# Patient Record
Sex: MALE | Race: WHITE | Employment: PART TIME | URBAN - METROPOLITAN AREA
[De-identification: names, ages, dates, MRNs, and addresses within clinical notes are randomized per-mention and may not be internally consistent; named-entity substitution may affect disease eponyms.]

---

## 2017-02-21 ENCOUNTER — OFFICE VISIT (OUTPATIENT)
Dept: FAMILY MEDICINE CLINIC | Age: 57
End: 2017-02-21

## 2017-02-21 VITALS
WEIGHT: 147.6 LBS | HEART RATE: 78 BPM | HEIGHT: 74 IN | RESPIRATION RATE: 18 BRPM | BODY MASS INDEX: 18.94 KG/M2 | TEMPERATURE: 98.6 F | SYSTOLIC BLOOD PRESSURE: 116 MMHG | DIASTOLIC BLOOD PRESSURE: 74 MMHG

## 2017-02-21 DIAGNOSIS — K63.5 COLON POLYP: ICD-10-CM

## 2017-02-21 DIAGNOSIS — T48.5X5A RHINITIS MEDICAMENTOSA: ICD-10-CM

## 2017-02-21 DIAGNOSIS — K64.4 EXTERNAL HEMORRHOID: ICD-10-CM

## 2017-02-21 DIAGNOSIS — J31.0 RHINITIS MEDICAMENTOSA: ICD-10-CM

## 2017-02-21 DIAGNOSIS — C49.9 SARCOMA (HCC): Primary | ICD-10-CM

## 2017-02-21 DIAGNOSIS — J30.81 CAT ALLERGY, AIRBORNE: ICD-10-CM

## 2017-02-21 PROCEDURE — 99214 OFFICE O/P EST MOD 30 MIN: CPT | Performed by: FAMILY MEDICINE

## 2017-04-03 ENCOUNTER — OFFICE VISIT (OUTPATIENT)
Dept: CARDIOLOGY | Age: 57
End: 2017-04-03

## 2017-04-03 ENCOUNTER — HOSPITAL ENCOUNTER (OUTPATIENT)
Dept: NON INVASIVE DIAGNOSTICS | Age: 57
Discharge: HOME | End: 2017-04-03
Payer: COMMERCIAL

## 2017-04-03 VITALS
WEIGHT: 154.2 LBS | BODY MASS INDEX: 19.79 KG/M2 | OXYGEN SATURATION: 98 % | HEIGHT: 74 IN | SYSTOLIC BLOOD PRESSURE: 130 MMHG | DIASTOLIC BLOOD PRESSURE: 80 MMHG | HEART RATE: 88 BPM

## 2017-04-03 DIAGNOSIS — Z95.0 PACEMAKER, ARTIFICIAL: ICD-10-CM

## 2017-04-03 DIAGNOSIS — C49.9 SARCOMA (HCC): Primary | ICD-10-CM

## 2017-04-03 LAB
LV EF: 48 %
LVEF MODALITY: NORMAL

## 2017-04-03 PROCEDURE — 93306 TTE W/DOPPLER COMPLETE: CPT

## 2017-04-03 PROCEDURE — 99204 OFFICE O/P NEW MOD 45 MIN: CPT | Performed by: INTERNAL MEDICINE

## 2017-04-03 RX ORDER — PANTOPRAZOLE SODIUM 40 MG/1
TABLET, DELAYED RELEASE ORAL
Refills: 0 | COMMUNITY
Start: 2017-03-20 | End: 2017-08-10 | Stop reason: ALTCHOICE

## 2017-04-03 RX ORDER — OXYCODONE HYDROCHLORIDE 5 MG/1
TABLET ORAL
COMMUNITY
Start: 2017-03-31 | End: 2017-08-10 | Stop reason: ALTCHOICE

## 2017-04-03 ASSESSMENT — ENCOUNTER SYMPTOMS: RESPIRATORY NEGATIVE: 1

## 2017-04-06 ENCOUNTER — OFFICE VISIT (OUTPATIENT)
Dept: SURGERY | Age: 57
End: 2017-04-06

## 2017-04-06 ENCOUNTER — PREP FOR PROCEDURE (OUTPATIENT)
Dept: SURGERY | Age: 57
End: 2017-04-06

## 2017-04-06 VITALS
TEMPERATURE: 98.2 F | DIASTOLIC BLOOD PRESSURE: 86 MMHG | HEIGHT: 74 IN | SYSTOLIC BLOOD PRESSURE: 130 MMHG | BODY MASS INDEX: 19.64 KG/M2 | WEIGHT: 153 LBS

## 2017-04-06 DIAGNOSIS — I87.8 POOR VENOUS ACCESS: Primary | ICD-10-CM

## 2017-04-06 PROCEDURE — 99202 OFFICE O/P NEW SF 15 MIN: CPT | Performed by: SURGERY

## 2017-04-06 RX ORDER — ASPIRIN 325 MG
325 TABLET ORAL EVERY 6 HOURS PRN
COMMUNITY
End: 2017-04-28

## 2017-04-06 ASSESSMENT — ENCOUNTER SYMPTOMS
ALLERGIC/IMMUNOLOGIC NEGATIVE: 1
COLOR CHANGE: 0
ABDOMINAL DISTENTION: 0
CONSTIPATION: 0
RHINORRHEA: 0
CHEST TIGHTNESS: 0
BLOOD IN STOOL: 0
ABDOMINAL PAIN: 0
EYES NEGATIVE: 1
SHORTNESS OF BREATH: 0
RESPIRATORY NEGATIVE: 1
GASTROINTESTINAL NEGATIVE: 1

## 2017-04-10 ENCOUNTER — OFFICE VISIT (OUTPATIENT)
Dept: CARDIOLOGY | Age: 57
End: 2017-04-10

## 2017-04-10 VITALS
WEIGHT: 153 LBS | RESPIRATION RATE: 12 BRPM | DIASTOLIC BLOOD PRESSURE: 80 MMHG | HEIGHT: 74 IN | SYSTOLIC BLOOD PRESSURE: 118 MMHG | HEART RATE: 84 BPM | BODY MASS INDEX: 19.64 KG/M2

## 2017-04-10 DIAGNOSIS — I34.0 MITRAL VALVE INSUFFICIENCY, UNSPECIFIED ETIOLOGY: ICD-10-CM

## 2017-04-10 DIAGNOSIS — Z95.0 PACEMAKER, ARTIFICIAL: Primary | ICD-10-CM

## 2017-04-10 PROCEDURE — 99213 OFFICE O/P EST LOW 20 MIN: CPT | Performed by: INTERNAL MEDICINE

## 2017-04-10 ASSESSMENT — ENCOUNTER SYMPTOMS
COLOR CHANGE: 0
COUGH: 0
CHEST TIGHTNESS: 0
APNEA: 0
SHORTNESS OF BREATH: 0

## 2017-04-13 ENCOUNTER — ANESTHESIA EVENT (OUTPATIENT)
Dept: OPERATING ROOM | Age: 57
End: 2017-04-13
Payer: COMMERCIAL

## 2017-04-14 ENCOUNTER — HOSPITAL ENCOUNTER (OUTPATIENT)
Dept: GENERAL RADIOLOGY | Age: 57
Setting detail: OUTPATIENT SURGERY
Discharge: HOME | End: 2017-04-14
Attending: SURGERY
Payer: COMMERCIAL

## 2017-04-14 ENCOUNTER — HOSPITAL ENCOUNTER (OUTPATIENT)
Age: 57
Setting detail: OUTPATIENT SURGERY
Discharge: HOME HEALTH | End: 2017-04-14
Attending: SURGERY | Admitting: SURGERY
Payer: COMMERCIAL

## 2017-04-14 ENCOUNTER — ANESTHESIA (OUTPATIENT)
Dept: OPERATING ROOM | Age: 57
End: 2017-04-14
Payer: COMMERCIAL

## 2017-04-14 VITALS
HEART RATE: 66 BPM | RESPIRATION RATE: 16 BRPM | DIASTOLIC BLOOD PRESSURE: 90 MMHG | SYSTOLIC BLOOD PRESSURE: 150 MMHG | OXYGEN SATURATION: 97 % | TEMPERATURE: 96.8 F

## 2017-04-14 VITALS — OXYGEN SATURATION: 100 % | SYSTOLIC BLOOD PRESSURE: 122 MMHG | TEMPERATURE: 93.7 F | DIASTOLIC BLOOD PRESSURE: 65 MMHG

## 2017-04-14 DIAGNOSIS — Z95.828 PORT CATHETER IN PLACE: ICD-10-CM

## 2017-04-14 PROCEDURE — 2580000003 HC RX 258: Performed by: STUDENT IN AN ORGANIZED HEALTH CARE EDUCATION/TRAINING PROGRAM

## 2017-04-14 PROCEDURE — 77001 FLUOROGUIDE FOR VEIN DEVICE: CPT | Performed by: SURGERY

## 2017-04-14 PROCEDURE — 3700000001 HC ADD 15 MINUTES (ANESTHESIA): Performed by: SURGERY

## 2017-04-14 PROCEDURE — 7100000010 HC PHASE II RECOVERY - FIRST 15 MIN: Performed by: SURGERY

## 2017-04-14 PROCEDURE — 6360000002 HC RX W HCPCS: Performed by: SURGERY

## 2017-04-14 PROCEDURE — 2500000003 HC RX 250 WO HCPCS: Performed by: STUDENT IN AN ORGANIZED HEALTH CARE EDUCATION/TRAINING PROGRAM

## 2017-04-14 PROCEDURE — 6370000000 HC RX 637 (ALT 250 FOR IP): Performed by: SURGERY

## 2017-04-14 PROCEDURE — 3600000013 HC SURGERY LEVEL 3 ADDTL 15MIN: Performed by: SURGERY

## 2017-04-14 PROCEDURE — C1788 PORT, INDWELLING, IMP: HCPCS | Performed by: SURGERY

## 2017-04-14 PROCEDURE — 7100000011 HC PHASE II RECOVERY - ADDTL 15 MIN: Performed by: SURGERY

## 2017-04-14 PROCEDURE — 6360000004 HC RX CONTRAST MEDICATION: Performed by: SURGERY

## 2017-04-14 PROCEDURE — 7100000000 HC PACU RECOVERY - FIRST 15 MIN: Performed by: SURGERY

## 2017-04-14 PROCEDURE — 2580000003 HC RX 258: Performed by: SURGERY

## 2017-04-14 PROCEDURE — 7100000001 HC PACU RECOVERY - ADDTL 15 MIN: Performed by: SURGERY

## 2017-04-14 PROCEDURE — 3209999900 FLUORO FOR SURGICAL PROCEDURES

## 2017-04-14 PROCEDURE — 3700000000 HC ANESTHESIA ATTENDED CARE: Performed by: SURGERY

## 2017-04-14 PROCEDURE — 3600000003 HC SURGERY LEVEL 3 BASE: Performed by: SURGERY

## 2017-04-14 PROCEDURE — 2580000003 HC RX 258: Performed by: NURSE ANESTHETIST, CERTIFIED REGISTERED

## 2017-04-14 PROCEDURE — 2500000003 HC RX 250 WO HCPCS: Performed by: NURSE ANESTHETIST, CERTIFIED REGISTERED

## 2017-04-14 PROCEDURE — 36571 INSERT PICVAD CATH: CPT | Performed by: SURGERY

## 2017-04-14 PROCEDURE — 6360000002 HC RX W HCPCS: Performed by: NURSE ANESTHETIST, CERTIFIED REGISTERED

## 2017-04-14 PROCEDURE — 2500000003 HC RX 250 WO HCPCS: Performed by: SURGERY

## 2017-04-14 DEVICE — BARDPORT M.R.I. IMPLANTABLE PORT WITH ATTACHABLE 9.6F OPEN-ENDED SINGLE-LUMEN VENOUS CATHETER INTERMEDIATE KIT (WITH PEEL-APART INTRODUCER)
Type: IMPLANTABLE DEVICE | Site: CHEST | Status: FUNCTIONAL
Brand: BARDPORT M.R.I.

## 2017-04-14 RX ORDER — SODIUM CHLORIDE 0.9 % (FLUSH) 0.9 %
10 SYRINGE (ML) INJECTION PRN
Status: DISCONTINUED | OUTPATIENT
Start: 2017-04-14 | End: 2017-04-14 | Stop reason: HOSPADM

## 2017-04-14 RX ORDER — GLYCOPYRROLATE 0.2 MG/ML
INJECTION INTRAMUSCULAR; INTRAVENOUS PRN
Status: DISCONTINUED | OUTPATIENT
Start: 2017-04-14 | End: 2017-04-14 | Stop reason: SDUPTHER

## 2017-04-14 RX ORDER — SODIUM CHLORIDE, SODIUM LACTATE, POTASSIUM CHLORIDE, CALCIUM CHLORIDE 600; 310; 30; 20 MG/100ML; MG/100ML; MG/100ML; MG/100ML
INJECTION, SOLUTION INTRAVENOUS CONTINUOUS
Status: DISCONTINUED | OUTPATIENT
Start: 2017-04-14 | End: 2017-04-14 | Stop reason: HOSPADM

## 2017-04-14 RX ORDER — LIDOCAINE HYDROCHLORIDE 20 MG/ML
INJECTION, SOLUTION INFILTRATION; PERINEURAL PRN
Status: DISCONTINUED | OUTPATIENT
Start: 2017-04-14 | End: 2017-04-14 | Stop reason: SDUPTHER

## 2017-04-14 RX ORDER — HYDROCODONE BITARTRATE AND ACETAMINOPHEN 5; 325 MG/1; MG/1
TABLET ORAL
Qty: 40 TABLET | Refills: 0 | Status: SHIPPED | OUTPATIENT
Start: 2017-04-14 | End: 2017-04-28

## 2017-04-14 RX ORDER — HYDROCODONE BITARTRATE AND ACETAMINOPHEN 5; 325 MG/1; MG/1
2 TABLET ORAL EVERY 4 HOURS PRN
Status: DISCONTINUED | OUTPATIENT
Start: 2017-04-14 | End: 2017-04-14 | Stop reason: HOSPADM

## 2017-04-14 RX ORDER — MIDAZOLAM HYDROCHLORIDE 1 MG/ML
INJECTION INTRAMUSCULAR; INTRAVENOUS PRN
Status: DISCONTINUED | OUTPATIENT
Start: 2017-04-14 | End: 2017-04-14 | Stop reason: SDUPTHER

## 2017-04-14 RX ORDER — MIDAZOLAM HYDROCHLORIDE 1 MG/ML
INJECTION INTRAMUSCULAR; INTRAVENOUS
Status: DISCONTINUED
Start: 2017-04-14 | End: 2017-04-14 | Stop reason: HOSPADM

## 2017-04-14 RX ORDER — ONDANSETRON 2 MG/ML
4 INJECTION INTRAMUSCULAR; INTRAVENOUS EVERY 6 HOURS PRN
Status: DISCONTINUED | OUTPATIENT
Start: 2017-04-14 | End: 2017-04-14 | Stop reason: HOSPADM

## 2017-04-14 RX ORDER — HEPARIN SODIUM (PORCINE) LOCK FLUSH IV SOLN 100 UNIT/ML 100 UNIT/ML
SOLUTION INTRAVENOUS PRN
Status: DISCONTINUED | OUTPATIENT
Start: 2017-04-14 | End: 2017-04-14 | Stop reason: HOSPADM

## 2017-04-14 RX ORDER — MORPHINE SULFATE 2 MG/ML
1 INJECTION, SOLUTION INTRAMUSCULAR; INTRAVENOUS
Status: DISCONTINUED | OUTPATIENT
Start: 2017-04-14 | End: 2017-04-14 | Stop reason: HOSPADM

## 2017-04-14 RX ORDER — SODIUM CHLORIDE, SODIUM LACTATE, POTASSIUM CHLORIDE, CALCIUM CHLORIDE 600; 310; 30; 20 MG/100ML; MG/100ML; MG/100ML; MG/100ML
INJECTION, SOLUTION INTRAVENOUS CONTINUOUS PRN
Status: DISCONTINUED | OUTPATIENT
Start: 2017-04-14 | End: 2017-04-14 | Stop reason: SDUPTHER

## 2017-04-14 RX ORDER — ONDANSETRON 2 MG/ML
4 INJECTION INTRAMUSCULAR; INTRAVENOUS
Status: DISCONTINUED | OUTPATIENT
Start: 2017-04-14 | End: 2017-04-14 | Stop reason: HOSPADM

## 2017-04-14 RX ORDER — PROMETHAZINE HYDROCHLORIDE 25 MG/ML
25 INJECTION, SOLUTION INTRAMUSCULAR; INTRAVENOUS
Status: DISCONTINUED | OUTPATIENT
Start: 2017-04-14 | End: 2017-04-14 | Stop reason: HOSPADM

## 2017-04-14 RX ORDER — ONDANSETRON 2 MG/ML
INJECTION INTRAMUSCULAR; INTRAVENOUS PRN
Status: DISCONTINUED | OUTPATIENT
Start: 2017-04-14 | End: 2017-04-14 | Stop reason: SDUPTHER

## 2017-04-14 RX ORDER — SODIUM CHLORIDE 0.9 % (FLUSH) 0.9 %
10 SYRINGE (ML) INJECTION EVERY 12 HOURS SCHEDULED
Status: DISCONTINUED | OUTPATIENT
Start: 2017-04-14 | End: 2017-04-14 | Stop reason: HOSPADM

## 2017-04-14 RX ORDER — FENTANYL CITRATE 50 UG/ML
50 INJECTION, SOLUTION INTRAMUSCULAR; INTRAVENOUS EVERY 5 MIN PRN
Status: DISCONTINUED | OUTPATIENT
Start: 2017-04-14 | End: 2017-04-14 | Stop reason: HOSPADM

## 2017-04-14 RX ORDER — LIDOCAINE HYDROCHLORIDE 10 MG/ML
1 INJECTION, SOLUTION EPIDURAL; INFILTRATION; INTRACAUDAL; PERINEURAL
Status: COMPLETED | OUTPATIENT
Start: 2017-04-14 | End: 2017-04-14

## 2017-04-14 RX ORDER — MAGNESIUM HYDROXIDE 1200 MG/15ML
LIQUID ORAL CONTINUOUS PRN
Status: DISCONTINUED | OUTPATIENT
Start: 2017-04-14 | End: 2017-04-14 | Stop reason: HOSPADM

## 2017-04-14 RX ORDER — PROPOFOL 10 MG/ML
INJECTION, EMULSION INTRAVENOUS PRN
Status: DISCONTINUED | OUTPATIENT
Start: 2017-04-14 | End: 2017-04-14 | Stop reason: SDUPTHER

## 2017-04-14 RX ORDER — FENTANYL CITRATE 50 UG/ML
INJECTION, SOLUTION INTRAMUSCULAR; INTRAVENOUS
Status: DISCONTINUED
Start: 2017-04-14 | End: 2017-04-14 | Stop reason: HOSPADM

## 2017-04-14 RX ORDER — KETOROLAC TROMETHAMINE 30 MG/ML
30 INJECTION, SOLUTION INTRAMUSCULAR; INTRAVENOUS ONCE
Status: COMPLETED | OUTPATIENT
Start: 2017-04-14 | End: 2017-04-14

## 2017-04-14 RX ORDER — FENTANYL CITRATE 50 UG/ML
INJECTION, SOLUTION INTRAMUSCULAR; INTRAVENOUS PRN
Status: DISCONTINUED | OUTPATIENT
Start: 2017-04-14 | End: 2017-04-14 | Stop reason: SDUPTHER

## 2017-04-14 RX ORDER — BUPIVACAINE HYDROCHLORIDE AND EPINEPHRINE 2.5; 5 MG/ML; UG/ML
INJECTION, SOLUTION EPIDURAL; INFILTRATION; INTRACAUDAL; PERINEURAL PRN
Status: DISCONTINUED | OUTPATIENT
Start: 2017-04-14 | End: 2017-04-14 | Stop reason: HOSPADM

## 2017-04-14 RX ORDER — HYDROCODONE BITARTRATE AND ACETAMINOPHEN 5; 325 MG/1; MG/1
1 TABLET ORAL EVERY 4 HOURS PRN
Status: DISCONTINUED | OUTPATIENT
Start: 2017-04-14 | End: 2017-04-14 | Stop reason: HOSPADM

## 2017-04-14 RX ADMIN — ONDANSETRON 4 MG: 2 INJECTION, SOLUTION INTRAMUSCULAR; INTRAVENOUS at 09:49

## 2017-04-14 RX ADMIN — SODIUM CHLORIDE, POTASSIUM CHLORIDE, SODIUM LACTATE AND CALCIUM CHLORIDE: 600; 310; 30; 20 INJECTION, SOLUTION INTRAVENOUS at 09:30

## 2017-04-14 RX ADMIN — GLYCOPYRROLATE 0.1 MG: 0.2 INJECTION INTRAMUSCULAR; INTRAVENOUS at 09:49

## 2017-04-14 RX ADMIN — SODIUM CHLORIDE, POTASSIUM CHLORIDE, SODIUM LACTATE AND CALCIUM CHLORIDE: 600; 310; 30; 20 INJECTION, SOLUTION INTRAVENOUS at 10:20

## 2017-04-14 RX ADMIN — LIDOCAINE HYDROCHLORIDE 100 MG: 20 INJECTION, SOLUTION INFILTRATION; PERINEURAL at 09:49

## 2017-04-14 RX ADMIN — KETOROLAC TROMETHAMINE 30 MG: 30 INJECTION, SOLUTION INTRAMUSCULAR; INTRAVENOUS at 08:41

## 2017-04-14 RX ADMIN — PHENYLEPHRINE HYDROCHLORIDE 100 MCG: 10 INJECTION INTRAVENOUS at 10:19

## 2017-04-14 RX ADMIN — FENTANYL CITRATE 50 MCG: 50 INJECTION, SOLUTION INTRAMUSCULAR; INTRAVENOUS at 09:47

## 2017-04-14 RX ADMIN — PHENYLEPHRINE HYDROCHLORIDE 100 MCG: 10 INJECTION INTRAVENOUS at 10:25

## 2017-04-14 RX ADMIN — LIDOCAINE HYDROCHLORIDE 0.1 ML: 10 INJECTION, SOLUTION EPIDURAL; INFILTRATION; INTRACAUDAL; PERINEURAL at 08:39

## 2017-04-14 RX ADMIN — MIDAZOLAM HYDROCHLORIDE 2 MG: 1 INJECTION, SOLUTION INTRAMUSCULAR; INTRAVENOUS at 09:38

## 2017-04-14 RX ADMIN — FENTANYL CITRATE 25 MCG: 50 INJECTION, SOLUTION INTRAMUSCULAR; INTRAVENOUS at 10:11

## 2017-04-14 RX ADMIN — Medication 2 G: at 09:41

## 2017-04-14 RX ADMIN — SODIUM CHLORIDE, POTASSIUM CHLORIDE, SODIUM LACTATE AND CALCIUM CHLORIDE: 600; 310; 30; 20 INJECTION, SOLUTION INTRAVENOUS at 08:39

## 2017-04-14 RX ADMIN — PROPOFOL 200 MG: 10 INJECTION, EMULSION INTRAVENOUS at 09:49

## 2017-04-14 ASSESSMENT — PAIN SCALES - GENERAL
PAINLEVEL_OUTOF10: 0

## 2017-04-14 ASSESSMENT — ENCOUNTER SYMPTOMS: STRIDOR: 0

## 2017-04-28 ENCOUNTER — OFFICE VISIT (OUTPATIENT)
Dept: SURGERY | Age: 57
End: 2017-04-28

## 2017-04-28 VITALS
SYSTOLIC BLOOD PRESSURE: 116 MMHG | WEIGHT: 149 LBS | DIASTOLIC BLOOD PRESSURE: 74 MMHG | HEIGHT: 74 IN | TEMPERATURE: 98.2 F | BODY MASS INDEX: 19.12 KG/M2

## 2017-04-28 DIAGNOSIS — I87.8 POOR VENOUS ACCESS: Primary | ICD-10-CM

## 2017-04-28 PROCEDURE — 99212 OFFICE O/P EST SF 10 MIN: CPT | Performed by: SURGERY

## 2017-04-28 RX ORDER — LIDOCAINE AND PRILOCAINE 25; 25 MG/G; MG/G
CREAM TOPICAL
Refills: 2 | COMMUNITY
Start: 2017-04-18 | End: 2017-08-10 | Stop reason: ALTCHOICE

## 2017-05-31 ENCOUNTER — TELEPHONE (OUTPATIENT)
Dept: FAMILY MEDICINE CLINIC | Age: 57
End: 2017-05-31

## 2017-06-08 ENCOUNTER — TELEPHONE (OUTPATIENT)
Dept: FAMILY MEDICINE CLINIC | Age: 57
End: 2017-06-08

## 2017-06-14 LAB
ANION GAP SERPL CALCULATED.3IONS-SCNC: 9 MEQ/L (ref 7–13)
BUN BLDV-MCNC: 18 MG/DL (ref 6–20)
CALCIUM SERPL-MCNC: 8.4 MG/DL (ref 8.6–10.2)
CHLORIDE BLD-SCNC: 106 MEQ/L (ref 98–107)
CO2: 20 MEQ/L (ref 22–29)
CREAT SERPL-MCNC: 1.37 MG/DL (ref 0.7–1.2)
GFR AFRICAN AMERICAN: >60
GFR NON-AFRICAN AMERICAN: 53.3
GLUCOSE BLD-MCNC: 110 MG/DL (ref 74–109)
POTASSIUM SERPL-SCNC: 4.1 MEQ/L (ref 3.5–5.1)
SODIUM BLD-SCNC: 135 MEQ/L (ref 132–144)

## 2017-06-26 ENCOUNTER — HOSPITAL ENCOUNTER (OUTPATIENT)
Dept: CT IMAGING | Age: 57
Discharge: HOME | End: 2017-06-26
Payer: COMMERCIAL

## 2017-06-26 VITALS — BODY MASS INDEX: 17.97 KG/M2 | WEIGHT: 140 LBS

## 2017-06-26 DIAGNOSIS — R19.00 RETROPERITONEAL MASS: ICD-10-CM

## 2017-06-26 PROCEDURE — 74176 CT ABD & PELVIS W/O CONTRAST: CPT

## 2017-06-26 PROCEDURE — 2500000003 HC RX 250 WO HCPCS: Performed by: INTERNAL MEDICINE

## 2017-06-26 PROCEDURE — 71250 CT THORAX DX C-: CPT

## 2017-06-26 RX ADMIN — BARIUM SULFATE 450 ML: 21 SUSPENSION ORAL at 10:18

## 2017-07-24 ENCOUNTER — OFFICE VISIT (OUTPATIENT)
Dept: CARDIOLOGY | Age: 57
End: 2017-07-24

## 2017-07-24 VITALS
OXYGEN SATURATION: 98 % | BODY MASS INDEX: 16.54 KG/M2 | WEIGHT: 133 LBS | SYSTOLIC BLOOD PRESSURE: 120 MMHG | HEART RATE: 81 BPM | DIASTOLIC BLOOD PRESSURE: 60 MMHG | HEIGHT: 75 IN

## 2017-07-24 DIAGNOSIS — C49.9 SARCOMA (HCC): ICD-10-CM

## 2017-07-24 DIAGNOSIS — Z95.0 PACEMAKER, ARTIFICIAL: Primary | ICD-10-CM

## 2017-07-24 DIAGNOSIS — K52.1 CHEMOTHERAPY INDUCED DIARRHEA: ICD-10-CM

## 2017-07-24 DIAGNOSIS — I34.0 NON-RHEUMATIC MITRAL REGURGITATION: ICD-10-CM

## 2017-07-24 DIAGNOSIS — T45.1X5A CHEMOTHERAPY INDUCED DIARRHEA: ICD-10-CM

## 2017-07-24 DIAGNOSIS — R06.02 SOB (SHORTNESS OF BREATH): ICD-10-CM

## 2017-07-24 PROCEDURE — 99213 OFFICE O/P EST LOW 20 MIN: CPT | Performed by: INTERNAL MEDICINE

## 2017-07-24 RX ORDER — METRONIDAZOLE 500 MG/1
TABLET ORAL
Refills: 0 | COMMUNITY
Start: 2017-07-11 | End: 2018-03-31 | Stop reason: ALTCHOICE

## 2017-07-24 RX ORDER — ONDANSETRON HYDROCHLORIDE 8 MG/1
TABLET, FILM COATED ORAL
Refills: 1 | COMMUNITY
Start: 2017-06-23

## 2017-07-24 ASSESSMENT — ENCOUNTER SYMPTOMS
SHORTNESS OF BREATH: 1
CHEST TIGHTNESS: 0
COLOR CHANGE: 0
APNEA: 0

## 2017-08-09 ENCOUNTER — HOSPITAL ENCOUNTER (OUTPATIENT)
Dept: NON INVASIVE DIAGNOSTICS | Age: 57
Discharge: HOME | End: 2017-08-09
Payer: COMMERCIAL

## 2017-08-09 ENCOUNTER — TELEPHONE (OUTPATIENT)
Dept: CARDIOLOGY | Age: 57
End: 2017-08-09

## 2017-08-09 LAB
LV EF: 23 %
LVEF MODALITY: NORMAL

## 2017-08-09 PROCEDURE — 93306 TTE W/DOPPLER COMPLETE: CPT

## 2017-08-10 ENCOUNTER — TELEPHONE (OUTPATIENT)
Dept: CARDIOLOGY | Age: 57
End: 2017-08-10

## 2017-08-10 ENCOUNTER — OFFICE VISIT (OUTPATIENT)
Dept: INFECTIOUS DISEASES | Age: 57
End: 2017-08-10

## 2017-08-10 VITALS
RESPIRATION RATE: 18 BRPM | HEIGHT: 75 IN | SYSTOLIC BLOOD PRESSURE: 105 MMHG | TEMPERATURE: 97.7 F | HEART RATE: 106 BPM | BODY MASS INDEX: 16.66 KG/M2 | DIASTOLIC BLOOD PRESSURE: 72 MMHG | WEIGHT: 134 LBS

## 2017-08-10 DIAGNOSIS — A04.72 C. DIFFICILE COLITIS: Primary | ICD-10-CM

## 2017-08-10 PROCEDURE — 99203 OFFICE O/P NEW LOW 30 MIN: CPT | Performed by: INTERNAL MEDICINE

## 2017-08-10 RX ORDER — ACYCLOVIR 200 MG/1
200 CAPSULE ORAL 4 TIMES DAILY
Qty: 28 CAPSULE | Refills: 0 | Status: SHIPPED | OUTPATIENT
Start: 2017-08-10 | End: 2017-08-17

## 2017-08-10 RX ORDER — VANCOMYCIN HYDROCHLORIDE 250 MG/1
250 CAPSULE ORAL 4 TIMES DAILY
Qty: 56 CAPSULE | Refills: 0 | Status: SHIPPED | OUTPATIENT
Start: 2017-08-10 | End: 2017-08-24

## 2017-08-10 RX ORDER — LOPERAMIDE HYDROCHLORIDE 2 MG/1
2 CAPSULE ORAL 4 TIMES DAILY PRN
COMMUNITY
End: 2018-01-09 | Stop reason: ALTCHOICE

## 2017-08-10 ASSESSMENT — ENCOUNTER SYMPTOMS
ANAL BLEEDING: 0
ABDOMINAL PAIN: 0
RECTAL PAIN: 0
BLOOD IN STOOL: 0
CONSTIPATION: 0
VOMITING: 0
GASTROINTESTINAL NEGATIVE: 1
NAUSEA: 0
RESPIRATORY NEGATIVE: 1

## 2017-08-17 ENCOUNTER — OFFICE VISIT (OUTPATIENT)
Dept: FAMILY MEDICINE CLINIC | Age: 57
End: 2017-08-17

## 2017-08-17 VITALS
SYSTOLIC BLOOD PRESSURE: 120 MMHG | HEART RATE: 76 BPM | DIASTOLIC BLOOD PRESSURE: 80 MMHG | RESPIRATION RATE: 16 BRPM | HEIGHT: 75 IN | TEMPERATURE: 97.7 F | WEIGHT: 134.6 LBS | BODY MASS INDEX: 16.73 KG/M2

## 2017-08-17 DIAGNOSIS — L72.3 SEBACEOUS CYST: ICD-10-CM

## 2017-08-17 DIAGNOSIS — L03.312 CELLULITIS OF BACK EXCEPT BUTTOCK: ICD-10-CM

## 2017-08-17 DIAGNOSIS — K63.5 POLYP OF COLON, UNSPECIFIED PART OF COLON, UNSPECIFIED TYPE: ICD-10-CM

## 2017-08-17 DIAGNOSIS — L98.491 SKIN ULCER, LIMITED TO BREAKDOWN OF SKIN (HCC): ICD-10-CM

## 2017-08-17 DIAGNOSIS — C49.9 SARCOMA (HCC): Primary | ICD-10-CM

## 2017-08-17 DIAGNOSIS — I34.0 NON-RHEUMATIC MITRAL REGURGITATION: ICD-10-CM

## 2017-08-17 PROCEDURE — 99214 OFFICE O/P EST MOD 30 MIN: CPT | Performed by: FAMILY MEDICINE

## 2017-08-17 ASSESSMENT — PATIENT HEALTH QUESTIONNAIRE - PHQ9
1. LITTLE INTEREST OR PLEASURE IN DOING THINGS: 0
SUM OF ALL RESPONSES TO PHQ9 QUESTIONS 1 & 2: 0
2. FEELING DOWN, DEPRESSED OR HOPELESS: 0
SUM OF ALL RESPONSES TO PHQ QUESTIONS 1-9: 0

## 2017-09-01 ENCOUNTER — OFFICE VISIT (OUTPATIENT)
Dept: CARDIOLOGY | Age: 57
End: 2017-09-01

## 2017-09-01 VITALS
HEIGHT: 75 IN | RESPIRATION RATE: 12 BRPM | OXYGEN SATURATION: 98 % | BODY MASS INDEX: 16.66 KG/M2 | WEIGHT: 134 LBS | HEART RATE: 92 BPM | DIASTOLIC BLOOD PRESSURE: 72 MMHG | SYSTOLIC BLOOD PRESSURE: 102 MMHG

## 2017-09-01 DIAGNOSIS — Z95.0 PACEMAKER, ARTIFICIAL: ICD-10-CM

## 2017-09-01 DIAGNOSIS — I34.0 MITRAL VALVE INSUFFICIENCY, UNSPECIFIED ETIOLOGY: ICD-10-CM

## 2017-09-01 DIAGNOSIS — I42.7 CARDIOMYOPATHY SECONDARY TO DRUG (HCC): Primary | ICD-10-CM

## 2017-09-01 PROCEDURE — 99214 OFFICE O/P EST MOD 30 MIN: CPT | Performed by: INTERNAL MEDICINE

## 2017-09-01 RX ORDER — DIPHENOXYLATE HYDROCHLORIDE AND ATROPINE SULFATE 2.5; .025 MG/1; MG/1
TABLET ORAL
Refills: 0 | COMMUNITY
Start: 2017-08-18

## 2017-09-01 ASSESSMENT — ENCOUNTER SYMPTOMS
COLOR CHANGE: 0
CHEST TIGHTNESS: 0
COUGH: 0
APNEA: 0
SHORTNESS OF BREATH: 1

## 2017-09-05 ENCOUNTER — OFFICE VISIT (OUTPATIENT)
Dept: INFECTIOUS DISEASES | Age: 57
End: 2017-09-05

## 2017-09-05 VITALS
HEIGHT: 75 IN | HEART RATE: 109 BPM | DIASTOLIC BLOOD PRESSURE: 68 MMHG | WEIGHT: 133 LBS | SYSTOLIC BLOOD PRESSURE: 95 MMHG | RESPIRATION RATE: 20 BRPM | BODY MASS INDEX: 16.54 KG/M2 | TEMPERATURE: 98.3 F

## 2017-09-05 DIAGNOSIS — A04.72 C. DIFFICILE COLITIS: Primary | ICD-10-CM

## 2017-09-05 PROCEDURE — 99212 OFFICE O/P EST SF 10 MIN: CPT | Performed by: INTERNAL MEDICINE

## 2017-09-05 RX ORDER — VANCOMYCIN HYDROCHLORIDE 250 MG/1
250 CAPSULE ORAL 4 TIMES DAILY
Qty: 40 CAPSULE | Refills: 0 | Status: SHIPPED | OUTPATIENT
Start: 2017-09-05 | End: 2017-09-15

## 2017-09-05 ASSESSMENT — ENCOUNTER SYMPTOMS
GASTROINTESTINAL NEGATIVE: 1
RESPIRATORY NEGATIVE: 1
ABDOMINAL PAIN: 0
ANAL BLEEDING: 0
BLOOD IN STOOL: 0
VOMITING: 0
NAUSEA: 0
CONSTIPATION: 0
RECTAL PAIN: 0

## 2017-09-07 ENCOUNTER — NURSE ONLY (OUTPATIENT)
Dept: CARDIOLOGY | Age: 57
End: 2017-09-07

## 2017-09-07 ENCOUNTER — TELEPHONE (OUTPATIENT)
Dept: CARDIOLOGY | Age: 57
End: 2017-09-07

## 2017-09-07 DIAGNOSIS — I25.5 ISCHEMIC CARDIOMYOPATHY: Primary | ICD-10-CM

## 2017-09-07 PROCEDURE — 93000 ELECTROCARDIOGRAM COMPLETE: CPT | Performed by: INTERNAL MEDICINE

## 2017-10-03 ENCOUNTER — TELEPHONE (OUTPATIENT)
Dept: CARDIOLOGY | Age: 57
End: 2017-10-03

## 2017-10-03 ENCOUNTER — NURSE ONLY (OUTPATIENT)
Dept: CARDIOLOGY | Age: 57
End: 2017-10-03

## 2017-10-03 DIAGNOSIS — I25.5 ISCHEMIC CARDIOMYOPATHY: Primary | ICD-10-CM

## 2017-10-03 PROCEDURE — 93000 ELECTROCARDIOGRAM COMPLETE: CPT | Performed by: INTERNAL MEDICINE

## 2017-10-03 NOTE — TELEPHONE ENCOUNTER
DR Erin Taylor ORDERED AN EKG TO BE DONE TODAY AND TO HAVE DR LAYNE READ THE EKG AND CONTACT HIM REGARDING THE RESULTS OF THE EKG.  EKG HAS BEEN DONE AND IS IN MEDIA TO BE READ

## 2017-10-16 ENCOUNTER — HOSPITAL ENCOUNTER (OUTPATIENT)
Dept: NON INVASIVE DIAGNOSTICS | Age: 57
Discharge: HOME | End: 2017-10-16
Payer: COMMERCIAL

## 2017-10-16 LAB
LV EF: 25 %
LVEF MODALITY: NORMAL

## 2017-10-16 PROCEDURE — 93306 TTE W/DOPPLER COMPLETE: CPT

## 2017-10-19 ENCOUNTER — HOSPITAL ENCOUNTER (OUTPATIENT)
Age: 57
Setting detail: SPECIMEN
Discharge: HOME | End: 2017-10-19
Payer: COMMERCIAL

## 2017-10-19 ENCOUNTER — HOSPITAL ENCOUNTER (OUTPATIENT)
Dept: CT IMAGING | Age: 57
Discharge: HOME | End: 2017-10-19
Payer: COMMERCIAL

## 2017-10-19 DIAGNOSIS — C49.9 LIPOSARCOMA (HCC): ICD-10-CM

## 2017-10-19 LAB
24HR URINE VOLUME (ML): 1375 ML
CREATININE 24 HOUR URINE: 1.06 G/TV (ref 1.5–2)
CREATININE URINE: 76.9 MG/DL
Lab: 24 HOURS
PROTEIN 24 HOUR URINE: 110 MG/TV (ref 0–200)

## 2017-10-19 PROCEDURE — 2500000003 HC RX 250 WO HCPCS: Performed by: INTERNAL MEDICINE

## 2017-10-19 PROCEDURE — 84156 ASSAY OF PROTEIN URINE: CPT

## 2017-10-19 PROCEDURE — 71250 CT THORAX DX C-: CPT

## 2017-10-19 PROCEDURE — 74176 CT ABD & PELVIS W/O CONTRAST: CPT

## 2017-10-19 RX ADMIN — BARIUM SULFATE 450 ML: 21 SUSPENSION ORAL at 09:30

## 2017-10-23 ENCOUNTER — OFFICE VISIT (OUTPATIENT)
Dept: CARDIOLOGY | Age: 57
End: 2017-10-23

## 2017-10-23 VITALS
HEART RATE: 91 BPM | BODY MASS INDEX: 16.54 KG/M2 | SYSTOLIC BLOOD PRESSURE: 110 MMHG | WEIGHT: 133 LBS | HEIGHT: 75 IN | OXYGEN SATURATION: 98 % | DIASTOLIC BLOOD PRESSURE: 70 MMHG

## 2017-10-23 DIAGNOSIS — R06.02 SOB (SHORTNESS OF BREATH): ICD-10-CM

## 2017-10-23 DIAGNOSIS — I34.0 MITRAL VALVE INSUFFICIENCY, UNSPECIFIED ETIOLOGY: Primary | ICD-10-CM

## 2017-10-23 DIAGNOSIS — Z95.0 PACEMAKER, ARTIFICIAL: ICD-10-CM

## 2017-10-23 PROCEDURE — G8484 FLU IMMUNIZE NO ADMIN: HCPCS | Performed by: INTERNAL MEDICINE

## 2017-10-23 PROCEDURE — 1036F TOBACCO NON-USER: CPT | Performed by: INTERNAL MEDICINE

## 2017-10-23 PROCEDURE — G8427 DOCREV CUR MEDS BY ELIG CLIN: HCPCS | Performed by: INTERNAL MEDICINE

## 2017-10-23 PROCEDURE — G8418 CALC BMI BLW LOW PARAM F/U: HCPCS | Performed by: INTERNAL MEDICINE

## 2017-10-23 PROCEDURE — 3017F COLORECTAL CA SCREEN DOC REV: CPT | Performed by: INTERNAL MEDICINE

## 2017-10-23 PROCEDURE — 99214 OFFICE O/P EST MOD 30 MIN: CPT | Performed by: INTERNAL MEDICINE

## 2017-10-23 PROCEDURE — G8598 ASA/ANTIPLAT THER USED: HCPCS | Performed by: INTERNAL MEDICINE

## 2017-10-23 ASSESSMENT — ENCOUNTER SYMPTOMS: SHORTNESS OF BREATH: 1

## 2017-10-23 NOTE — PROGRESS NOTES
Left Ear: External ear normal.   Mouth/Throat: Oropharynx is clear and moist.   Eyes: Conjunctivae and EOM are normal. Pupils are equal, round, and reactive to light. Neck: Normal range of motion. Neck supple. Cardiovascular: Normal rate, regular rhythm and intact distal pulses. Exam reveals gallop. Pulmonary/Chest: Effort normal and breath sounds normal.   Abdominal: Soft. Bowel sounds are normal.   Musculoskeletal: Normal range of motion. Neurological: He is alert and oriented to person, place, and time. He has normal reflexes. Skin: Skin is warm and dry. Psychiatric: He has a normal mood and affect. His behavior is normal. Judgment and thought content normal.           Assessment/Orders:     ICD-10-CM ICD-9-CM    1. Mitral valve insufficiency, unspecified etiology I34.0 424.0    2. Pacemaker, artificial Z95.0 V45.01    3. SOB (shortness of breath) R06.02 786.05        No orders of the defined types were placed in this encounter. There are no discontinued medications. No orders of the defined types were placed in this encounter. Plan:  1. Patient to see me in 3 months.  > I will continue to monitor patient clinically, if symptoms develop or worsen, they are to let me know ASAP or head to the nearest emergeny room. > Follow up as discussed or call office sooner if needed.  > If refills are needed after appointment contact pharmacy.       Electronically signed by: Augustine Sebastian MD  10/23/2017 10:13 AM

## 2017-12-22 ENCOUNTER — HOSPITAL ENCOUNTER (OUTPATIENT)
Dept: CT IMAGING | Age: 57
Discharge: HOME | End: 2017-12-22
Payer: COMMERCIAL

## 2017-12-22 DIAGNOSIS — C49.9 SARCOMA (HCC): ICD-10-CM

## 2017-12-22 PROCEDURE — 74177 CT ABD & PELVIS W/CONTRAST: CPT

## 2017-12-22 PROCEDURE — 71260 CT THORAX DX C+: CPT

## 2017-12-22 PROCEDURE — 6360000004 HC RX CONTRAST MEDICATION: Performed by: RADIOLOGY

## 2017-12-22 PROCEDURE — 2500000003 HC RX 250 WO HCPCS: Performed by: RADIOLOGY

## 2017-12-22 RX ADMIN — IOVERSOL 100 ML: 678 INJECTION INTRA-ARTERIAL; INTRAVENOUS at 10:58

## 2017-12-22 RX ADMIN — BARIUM SULFATE 450 ML: 21 SUSPENSION ORAL at 10:59

## 2018-01-09 ENCOUNTER — OFFICE VISIT (OUTPATIENT)
Dept: CARDIOLOGY | Age: 58
End: 2018-01-09

## 2018-01-09 VITALS
SYSTOLIC BLOOD PRESSURE: 110 MMHG | RESPIRATION RATE: 16 BRPM | HEIGHT: 75 IN | DIASTOLIC BLOOD PRESSURE: 70 MMHG | WEIGHT: 141.4 LBS | BODY MASS INDEX: 17.58 KG/M2 | OXYGEN SATURATION: 98 % | HEART RATE: 75 BPM

## 2018-01-09 DIAGNOSIS — I42.7 CARDIOMYOPATHY SECONDARY TO DRUG (HCC): ICD-10-CM

## 2018-01-09 DIAGNOSIS — Z95.0 PACEMAKER, ARTIFICIAL: ICD-10-CM

## 2018-01-09 DIAGNOSIS — I34.0 MITRAL VALVE INSUFFICIENCY, UNSPECIFIED ETIOLOGY: Primary | ICD-10-CM

## 2018-01-09 PROCEDURE — G8418 CALC BMI BLW LOW PARAM F/U: HCPCS | Performed by: INTERNAL MEDICINE

## 2018-01-09 PROCEDURE — G8427 DOCREV CUR MEDS BY ELIG CLIN: HCPCS | Performed by: INTERNAL MEDICINE

## 2018-01-09 PROCEDURE — 99214 OFFICE O/P EST MOD 30 MIN: CPT | Performed by: INTERNAL MEDICINE

## 2018-01-09 PROCEDURE — G8484 FLU IMMUNIZE NO ADMIN: HCPCS | Performed by: INTERNAL MEDICINE

## 2018-01-09 PROCEDURE — 3017F COLORECTAL CA SCREEN DOC REV: CPT | Performed by: INTERNAL MEDICINE

## 2018-01-09 PROCEDURE — 1036F TOBACCO NON-USER: CPT | Performed by: INTERNAL MEDICINE

## 2018-01-09 RX ORDER — OXYCODONE HYDROCHLORIDE 5 MG/1
5 TABLET ORAL EVERY 4 HOURS PRN
COMMUNITY

## 2018-01-09 ASSESSMENT — ENCOUNTER SYMPTOMS
CHEST TIGHTNESS: 0
SHORTNESS OF BREATH: 1
APNEA: 0

## 2018-01-09 NOTE — PROGRESS NOTES
 Cat Hair Extract Itching       Personal History:   has a past medical history of Cancer ([facility] [address] Dislocated shoulder; History of colon polyps; Internal hemorrhoids; Mitral valve regurgitation; Pacemaker, artificial; Renal insufficiency; SOB (shortness of breath); and Soft tissue sarcoma of abdominal wall ([facility] [address] Social History:   reports that he has never smoked. He has never used smokeless tobacco. He reports that he does not use drugs. Surgical History:  Past Surgical History:   Procedure Laterality Date    COLON SURGERY      COLONOSCOPY  2010    DR. HENRY    INSERTION / REMOVAL / REPLACEMENT VENOUS ACCESS CATHETER Right 4/14/2017    PLACEMENT SUBCUTANEOUS VENOUS PORT  performed by Crissy Phan MD at [facility] [address]  11/29/2016    DR. CASTILLO     RESECTION INTRAABDOMINAL SARCOMA(Partial left nephrectomy, bowel resection and partial carlos a resection)    PACEMAKER PLACEMENT  11/22/2016       Family History:  family history includes Alzheimer's Disease in his mother; Cancer in his father; Diabetes in his brother; Heart Disease in his father; High Blood Pressure in his father and mother. Current Medications:    Current Outpatient Prescriptions:     oxyCODONE (ROXICODONE) 5 MG immediate release tablet, Take 5 mg by mouth every 4 hours as needed for Pain., Disp: , Rfl:     PAZOPanib HCl (VOTRIENT) 200 MG chemo tablet, Take 800 mg by mouth daily, Disp: , Rfl:     diphenoxylate-atropine (LOMOTIL) 2.5-0.025 MG per tablet, TAKE 1 TABLET EVERY 6 HOURS AS NEEDED FOR DIARRHEA, Disp: , Rfl: 0    Calcium Polycarbophil (FIBERCON PO), Take by mouth, Disp: , Rfl:     metroNIDAZOLE (FLAGYL) 500 MG tablet, TAKE 1 TABLET BY MOUTH THREE TIMES A DAY, Disp: , Rfl: 0    ondansetron (ZOFRAN) 8 MG tablet, TAKE ONE TABLET BY MOUTH EVERY 8 HOURS AS NEEDED FOR NAUSEA, Disp: , Rfl: 1      Review of Systems:  Review of Systems   Constitutional: Negative for appetite change and fatigue.    Respiratory: Positive for shortness of breath. Negative for apnea and chest tightness. Occassional    Cardiovascular: Negative for chest pain, palpitations and leg swelling. Neurological: Negative for dizziness, syncope, weakness, light-headedness and headaches. Hematological: Does not bruise/bleed easily. Psychiatric/Behavioral: Negative for agitation, behavioral problems and confusion. The patient is not nervous/anxious and is not hyperactive. All other systems reviewed and are negative. Objective  Vitals:    01/09/18 1331   BP: 110/70   Site: Left Arm   Position: Sitting   Cuff Size: Medium Adult   Pulse: 75   Resp: 16   SpO2: 98%   Weight: 141 lb 6.4 oz (64.1 kg)   Height: 6' 3\" (1.905 m)         Physical Exam:  Physical Exam   Constitutional: He is oriented to person, place, and time. He appears well-developed and well-nourished. Cardiovascular: Normal rate, regular rhythm, normal heart sounds and intact distal pulses. Pulmonary/Chest: Effort normal and breath sounds normal.   Musculoskeletal: Normal range of motion. Neurological: He is alert and oriented to person, place, and time. He has normal reflexes. Skin: Skin is warm and dry. Psychiatric: He has a normal mood and affect. His behavior is normal. Judgment and thought content normal.           Assessment/Orders:       ICD-10-CM ICD-9-CM    1. Mitral valve insufficiency, unspecified etiology I34.0 424.0    2. Pacemaker, artificial Z95.0 V45.01    3. Cardiomyopathy secondary to drug ([facility] I42.7 425.9      E947.9        No orders of the defined types were placed in this encounter. Medications Discontinued During This Encounter   Medication Reason    loperamide (IMODIUM) 2 MG capsule Alternate therapy       No orders of the defined types were placed in this encounter. Plan:  1.  Patient to see me in 3 months  > I will continue to monitor patient clinically, if symptoms develop or worsen, they are to let me know ASAP or head to the nearest [facility] room. > Follow up as discussed or call office sooner if needed.  > If refills are needed after appointment contact pharmacy.         Electronically signed by: Myrl Claude, MD  1/9/2018 1:56 PM

## 2018-02-13 ENCOUNTER — OFFICE VISIT (OUTPATIENT)
Dept: INFECTIOUS DISEASES | Age: 58
End: 2018-02-13
Payer: COMMERCIAL

## 2018-02-13 VITALS
BODY MASS INDEX: 16.56 KG/M2 | SYSTOLIC BLOOD PRESSURE: 119 MMHG | HEART RATE: 73 BPM | RESPIRATION RATE: 14 BRPM | DIASTOLIC BLOOD PRESSURE: 81 MMHG | WEIGHT: 133.2 LBS | HEIGHT: 75 IN

## 2018-02-13 DIAGNOSIS — A04.72 C. DIFFICILE COLITIS: Primary | ICD-10-CM

## 2018-02-13 PROCEDURE — G8427 DOCREV CUR MEDS BY ELIG CLIN: HCPCS | Performed by: INTERNAL MEDICINE

## 2018-02-13 PROCEDURE — 99212 OFFICE O/P EST SF 10 MIN: CPT | Performed by: INTERNAL MEDICINE

## 2018-02-13 PROCEDURE — 1036F TOBACCO NON-USER: CPT | Performed by: INTERNAL MEDICINE

## 2018-02-13 PROCEDURE — G8419 CALC BMI OUT NRM PARAM NOF/U: HCPCS | Performed by: INTERNAL MEDICINE

## 2018-02-13 PROCEDURE — G8484 FLU IMMUNIZE NO ADMIN: HCPCS | Performed by: INTERNAL MEDICINE

## 2018-02-13 PROCEDURE — 3017F COLORECTAL CA SCREEN DOC REV: CPT | Performed by: INTERNAL MEDICINE

## 2018-02-13 RX ORDER — VANCOMYCIN HYDROCHLORIDE 250 MG/1
250 CAPSULE ORAL 4 TIMES DAILY
COMMUNITY
End: 2018-04-10 | Stop reason: ALTCHOICE

## 2018-02-13 RX ORDER — LIDOCAINE 40 MG/G
CREAM TOPICAL PRN
COMMUNITY

## 2018-02-13 RX ORDER — DICYCLOMINE HCL 20 MG
TABLET ORAL
Refills: 11 | COMMUNITY
Start: 2018-01-22

## 2018-02-13 RX ORDER — ACETAMINOPHEN 325 MG/1
650 TABLET ORAL EVERY 6 HOURS PRN
COMMUNITY

## 2018-02-13 ASSESSMENT — ENCOUNTER SYMPTOMS
BLOOD IN STOOL: 0
CONSTIPATION: 0
NAUSEA: 0
DIARRHEA: 1
ABDOMINAL PAIN: 0
RESPIRATORY NEGATIVE: 1
VOMITING: 0
ANAL BLEEDING: 0
RECTAL PAIN: 0

## 2018-03-13 ENCOUNTER — OFFICE VISIT (OUTPATIENT)
Dept: PALLATIVE CARE | Age: 58
End: 2018-03-13
Payer: COMMERCIAL

## 2018-03-13 VITALS
HEIGHT: 73 IN | WEIGHT: 128 LBS | BODY MASS INDEX: 16.96 KG/M2 | RESPIRATION RATE: 20 BRPM | DIASTOLIC BLOOD PRESSURE: 88 MMHG | TEMPERATURE: 97.8 F | OXYGEN SATURATION: 95 % | HEART RATE: 78 BPM | SYSTOLIC BLOOD PRESSURE: 104 MMHG

## 2018-03-13 DIAGNOSIS — K90.9 DIARRHEA DUE TO MALABSORPTION: Primary | ICD-10-CM

## 2018-03-13 DIAGNOSIS — Z51.5 PALLIATIVE CARE ENCOUNTER: ICD-10-CM

## 2018-03-13 DIAGNOSIS — R06.02 SOB (SHORTNESS OF BREATH): ICD-10-CM

## 2018-03-13 DIAGNOSIS — G89.3 NEOPLASM RELATED PAIN: ICD-10-CM

## 2018-03-13 DIAGNOSIS — R63.0 ANOREXIA: ICD-10-CM

## 2018-03-13 DIAGNOSIS — R19.7 DIARRHEA DUE TO MALABSORPTION: Primary | ICD-10-CM

## 2018-03-13 PROBLEM — R64 CACHEXIA (HCC): Status: ACTIVE | Noted: 2018-03-13

## 2018-03-13 PROCEDURE — 99205 OFFICE O/P NEW HI 60 MIN: CPT | Performed by: NURSE PRACTITIONER

## 2018-03-13 RX ORDER — CHOLESTYRAMINE 4 G/9G
1 POWDER, FOR SUSPENSION ORAL 2 TIMES DAILY
Qty: 90 PACKET | Refills: 3 | Status: SHIPPED | OUTPATIENT
Start: 2018-03-13 | End: 2018-03-27 | Stop reason: ALTCHOICE

## 2018-03-13 ASSESSMENT — ENCOUNTER SYMPTOMS
VOMITING: 0
CONSTIPATION: 0
ABDOMINAL DISTENTION: 0
SORE THROAT: 0
CHOKING: 0
CHEST TIGHTNESS: 0
COLOR CHANGE: 0
RECTAL PAIN: 1
SHORTNESS OF BREATH: 1
WHEEZING: 0
ABDOMINAL PAIN: 0
NAUSEA: 0
BLOOD IN STOOL: 0
TROUBLE SWALLOWING: 0
COUGH: 0
DIARRHEA: 1

## 2018-03-13 NOTE — PROGRESS NOTES
Patient ID:  Beatriz Durham is a 59 y.o. male of Dr. Moshe Aguilar, referred by Dr. Mike Mcbride for Initial Palliative Care Consult to address symptom management, advance care planning and goals of care conversation. Beatriz Durham is being seen today at the clinic at [facility]. Patient presents today with   Chief Complaint   Patient presents with    Diarrhea    Pain    Weight Loss    Shortness of Breath        HPI:  Patient appears calm, relaxed, NAD, no sedation. Patient presents with main c/o diarrhea r/t side effects of current chemo pill (VOTRIENT). 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 Patient's current symptom complaint includes:Diarrhea, weight loss, SOB, pain. Patient has tried multiple anti-diarrheal agents, dietary modifications over the past several months. He gets IVF at cancer center about once weekly to prevent dehydration. Pt has been treated for c-diff in the past month with oral Vanco. Pt states currently he is taking Bentyl and that seems to be controlling the diarrhea for the past month. Pt is working 3 days per week at [employer] and states he fatigues easily and gets SOB if he does things too fast. Pt weight is 128 and his normal weight before cancer was 180s. He states he has an appetite but his food choices are limited do to many foods causing diarrhea for him. He can only eat small portions.    Past Medical History:   Diagnosis Date    Cancer [facility]     Dislocated shoulder 1984    History of colon polyps     Internal hemorrhoids     Mitral valve regurgitation 7/24/2017    Pacemaker, artificial 4/3/2017    Renal insufficiency     SOB (shortness of breath) 7/24/2017    Soft tissue sarcoma of abdominal wall [facility])      Past Surgical History:   Procedure Laterality Date    COLON SURGERY      COLONOSCOPY  2010    DR. HENRY    INSERTION / REMOVAL / REPLACEMENT VENOUS ACCESS CATHETER Right 4/14/2017    PLACEMENT SUBCUTANEOUS VENOUS PORT  performed by Jules Golden MD at [facility] [address]  11/29/2016    DR. CASTILLO     RESECTION INTRAABDOMINAL SARCOMA(Partial left nephrectomy, bowel resection and partial carlos a resection)    PACEMAKER PLACEMENT  11/22/2016     Social History     Social History    Marital status: Single     Spouse name: N/A    Number of children: N/A    Years of education: N/A     Occupational History    Not on file. Social History Main Topics    Smoking status: Never Smoker    Smokeless tobacco: Never Used    Alcohol use No    Drug use: No    Sexual activity: Not on file     Other Topics Concern    Not on file     Social History Narrative    No narrative on file     Family History   Problem Relation Age of Onset    High Blood Pressure Mother     Alzheimer's Disease Mother     Heart Disease Father     Cancer Father     High Blood Pressure Father     Diabetes Brother      Allergies   Allergen Reactions    Cat Hair Extract Itching     Current Outpatient Prescriptions on File Prior to Visit   Medication Sig Dispense Refill    dicyclomine (BENTYL) 20 MG tablet TAKE 1 TABLET BY MOUTH BEFORE MEALS (UP TO THREE TIMES DAILY) AS DIRECTED  11    Omega-3 Fatty Acids (FISH OIL PO) Take by mouth daily as needed      lidocaine (LMX) 4 % cream Apply topically as needed for Pain Apply topically as needed.  Multiple Vitamin (MULTI VITAMIN DAILY PO) Take by mouth daily      Simethicone 125 MG TABS Take by mouth daily as needed      acetaminophen (TYLENOL) 325 MG tablet Take 650 mg by mouth every 6 hours as needed for Pain      oxyCODONE (ROXICODONE) 5 MG immediate release tablet Take 5 mg by mouth every 4 hours as needed for Pain.       PAZOPanib HCl hydration at cancer center    4. SOB (shortness of breath)  r/t ischemic cardiomyopathy. EF 25%  No anginal complaints  Call for increased SOB, cough, sputum production, excessive fatigue, fever, chills, or myalgias  Gentle exercise as tolerated  Discussed importance of pacing and energy conservation    5. Palliative care encounter  Advance Care Planning:  Patient has completed Living Will (LW), and [facility] We discussed Code Statuses at length. Patient wishes to remain Full Code. Goals of Care:  I discussed patients disease process and goals of treatment. Patients goal is to continue to work at [employer] and get control of the diarrhea. He wishes to speak with LISW for some advice on assistance with financial burden the treatments have posed upon him. I asked the patient if he has given any thought as to how he would like his care to be if he gets sicker or is unable to tolerate chemo and he states, \"If they give me 6 months to live, I will say see you in 8\". I discussed the palliative care philosophy in light of those goals. I urged acceptance of the nonreversible changes that have resulted from the disease. I also encouraged hope redefinition of hope that is, despite progressive debility- and setting realistic goals. I emphasized the palliative care support throughout the course of the disease regardless of choice of treatments. I noted the availability of support as needed through our , spiritual care and bereavement support team. Discussed availability of LISW, , and grief support. Much active listening, presence, and emotional support were given. Thanks for the opportunity you have allowed us to provide palliative care to this patient in need of comfort and support. We will continue to be in touch with you as care progresses. Return in about 2 weeks (around 3/27/2018), or if symptoms worsen or fail to improve.     Joaquin Beltrán, POLI

## 2018-03-20 ENCOUNTER — NURSE ONLY (OUTPATIENT)
Dept: PALLATIVE CARE | Age: 58
End: 2018-03-20

## 2018-03-20 DIAGNOSIS — Z59.9 FINANCIAL DIFFICULTIES: Primary | ICD-10-CM

## 2018-03-20 SDOH — ECONOMIC STABILITY - INCOME SECURITY: PROBLEM RELATED TO HOUSING AND ECONOMIC CIRCUMSTANCES, UNSPECIFIED: Z59.9

## 2018-03-23 ENCOUNTER — TELEPHONE (OUTPATIENT)
Dept: PALLATIVE CARE | Age: 58
End: 2018-03-23

## 2018-03-23 NOTE — TELEPHONE ENCOUNTER
1020. Left message for Alexa Quinonez that I have some insurance information to share with him. Asked him to call back.

## 2018-03-27 ENCOUNTER — OFFICE VISIT (OUTPATIENT)
Dept: PALLATIVE CARE | Age: 58
End: 2018-03-27
Payer: COMMERCIAL

## 2018-03-27 VITALS
HEART RATE: 53 BPM | DIASTOLIC BLOOD PRESSURE: 78 MMHG | SYSTOLIC BLOOD PRESSURE: 100 MMHG | TEMPERATURE: 97.3 F | OXYGEN SATURATION: 95 % | RESPIRATION RATE: 18 BRPM

## 2018-03-27 DIAGNOSIS — K90.9 DIARRHEA DUE TO MALABSORPTION: Primary | ICD-10-CM

## 2018-03-27 DIAGNOSIS — R53.1 GENERALIZED WEAKNESS: ICD-10-CM

## 2018-03-27 DIAGNOSIS — Z51.5 PALLIATIVE CARE ENCOUNTER: ICD-10-CM

## 2018-03-27 DIAGNOSIS — R53.0 NEOPLASTIC MALIGNANT RELATED FATIGUE: ICD-10-CM

## 2018-03-27 DIAGNOSIS — G89.3 NEOPLASM RELATED PAIN: ICD-10-CM

## 2018-03-27 DIAGNOSIS — R19.7 DIARRHEA DUE TO MALABSORPTION: Primary | ICD-10-CM

## 2018-03-27 PROCEDURE — 99214 OFFICE O/P EST MOD 30 MIN: CPT | Performed by: NURSE PRACTITIONER

## 2018-03-27 ASSESSMENT — ENCOUNTER SYMPTOMS
NAUSEA: 0
WHEEZING: 0
SORE THROAT: 0
CHOKING: 0
COUGH: 0
ABDOMINAL DISTENTION: 0
CONSTIPATION: 0
BLOOD IN STOOL: 0
DIARRHEA: 1
COLOR CHANGE: 0
ABDOMINAL PAIN: 0
TROUBLE SWALLOWING: 0
VOMITING: 0
SHORTNESS OF BREATH: 0
RECTAL PAIN: 1
CHEST TIGHTNESS: 0

## 2018-03-27 NOTE — PROGRESS NOTES
tightness, shortness of breath and wheezing. Cardiovascular: Negative for chest pain, palpitations and leg swelling. Gastrointestinal: Positive for diarrhea and rectal pain. Negative for abdominal distention, abdominal pain, blood in stool, constipation, nausea and vomiting. Genitourinary: Negative for dysuria, flank pain and hematuria. Musculoskeletal: Negative for arthralgias, joint swelling and myalgias. Skin: Positive for pallor. Negative for color change, rash and wound. Neurological: Positive for weakness. Negative for dizziness, seizures, syncope, light-headedness, numbness and headaches. Psychiatric/Behavioral: Negative for agitation, behavioral problems, confusion, dysphoric mood, hallucinations, sleep disturbance and suicidal ideas. The patient is not nervous/anxious. Negative existential symptoms           Objective:   /78 (Site: Left Arm, Position: Sitting)   Pulse 53   Temp 97.3 °F (36.3 °C)   Resp 18   SpO2 95%     Physical Exam   Constitutional: He is oriented to person, place, and time. He is cooperative. No distress. Chronically ill appearing, cachectic   HENT:   Head: Normocephalic and atraumatic. Nose: Nose normal.   Mouth/Throat: Oropharynx is clear and moist.   Eyes: Conjunctivae are normal. No scleral icterus. Neck: Normal range of motion. Neck supple. No JVD present. No thyromegaly present. Cardiovascular: Normal rate, regular rhythm and intact distal pulses. Pulmonary/Chest: Effort normal and breath sounds normal.   Abdominal: Soft. Bowel sounds are normal. He exhibits no distension. There is no tenderness. There is no rebound and no guarding. Musculoskeletal: He exhibits no edema or tenderness. Able to move all 4 extremities, gait steady   Lymphadenopathy:     He has no cervical adenopathy. Neurological: He is alert and oriented to person, place, and time. Skin: Skin is warm and dry. No rash noted.    Psychiatric: He has a normal mood and affect. His behavior is normal. Judgment and thought content normal.   Vitals reviewed. Assessment/Plan:      1. Diarrhea due to malabsorption  -Continue Bentyl as RX  -Lomotil and Questran have been ineffective  -Recommended pt discuss octreotide with Oncologist to see if this would be an option for symptom relief. - bismuth subsalicylate (BISMATROL) 262 MG/15ML suspension; Take 30 mLs by mouth 4 times daily  Dispense: 473 mL; Refill: 0    2. Neoplastic malignant related fatigue  Discussed disease process with patient. Discussed lifestyle modifications as means of resolving problem: pacing activities, adequate sleep/rest, proper hydration and nutrition. 3. Generalized weakness  Discussed importance of adequate hydration, nutrition and rest  Take medications as RX  Advised some physical activity every day within physical limitations, but do not get too tired. Call for new or worsening weakness, dizziness, lightheadedness or fainting    4. Neoplasm related pain  Progress toward goal:Yes, patient is maintaining ADL and functional goal.  Continue present management  Heat or ice to painful areas, whichever is preferred  Gentle ROM exercises  Call for uncontrolled pain    5. Palliative care encounter  Much active listening, emotional support and presence given. Return in about 2 weeks (around 4/10/2018), or if symptoms worsen or fail to improve.     Tatiana Correa NP

## 2018-03-29 ENCOUNTER — HOSPITAL ENCOUNTER (OUTPATIENT)
Dept: CT IMAGING | Age: 58
Discharge: HOME | End: 2018-03-31
Payer: COMMERCIAL

## 2018-03-29 ENCOUNTER — TELEPHONE (OUTPATIENT)
Dept: INFECTIOUS DISEASES | Age: 58
End: 2018-03-29

## 2018-03-29 VITALS — BODY MASS INDEX: 16.49 KG/M2 | WEIGHT: 125 LBS

## 2018-03-29 DIAGNOSIS — C49.9 SARCOMA (HCC): ICD-10-CM

## 2018-03-29 PROCEDURE — 2500000003 HC RX 250 WO HCPCS: Performed by: RADIOLOGY

## 2018-03-29 PROCEDURE — 71250 CT THORAX DX C-: CPT

## 2018-03-29 PROCEDURE — 74176 CT ABD & PELVIS W/O CONTRAST: CPT

## 2018-03-29 RX ADMIN — BARIUM SULFATE 450 ML: 21 SUSPENSION ORAL at 10:01

## 2018-03-30 ENCOUNTER — TELEPHONE (OUTPATIENT)
Dept: INFECTIOUS DISEASES | Age: 58
End: 2018-03-30

## 2018-03-30 ENCOUNTER — TELEPHONE (OUTPATIENT)
Dept: PALLATIVE CARE | Age: 58
End: 2018-03-30

## 2018-03-30 LAB
C DIFF TOXIN B: POSITIVE
Lab: NEGATIVE

## 2018-03-30 NOTE — TELEPHONE ENCOUNTER
Received call from [facility] from hematology. Tristen update the ID clinic with the result. Patient  Is positive for C-Diff . Dr. Emir Mora prescribe medication,  Flagyl 500 mg BID,Vanco 250 mg 4 x A Day. Patient has F/U appointment to see Dr. Joseph Singh 04/03/18.

## 2018-03-31 DIAGNOSIS — A49.8 CLOSTRIDIUM DIFFICILE INFECTION: Primary | ICD-10-CM

## 2018-03-31 RX ORDER — METRONIDAZOLE 500 MG/1
500 TABLET ORAL 3 TIMES DAILY
Qty: 42 TABLET | Refills: 0 | Status: SHIPPED | OUTPATIENT
Start: 2018-03-31 | End: 2018-04-10 | Stop reason: ALTCHOICE

## 2018-04-03 ENCOUNTER — OFFICE VISIT (OUTPATIENT)
Dept: INFECTIOUS DISEASES | Age: 58
End: 2018-04-03
Payer: COMMERCIAL

## 2018-04-03 VITALS
RESPIRATION RATE: 20 BRPM | SYSTOLIC BLOOD PRESSURE: 104 MMHG | HEIGHT: 73 IN | BODY MASS INDEX: 17.69 KG/M2 | DIASTOLIC BLOOD PRESSURE: 68 MMHG | WEIGHT: 133.5 LBS | HEART RATE: 86 BPM

## 2018-04-03 DIAGNOSIS — A04.72 C. DIFFICILE COLITIS: Primary | ICD-10-CM

## 2018-04-03 PROCEDURE — G8427 DOCREV CUR MEDS BY ELIG CLIN: HCPCS | Performed by: INTERNAL MEDICINE

## 2018-04-03 PROCEDURE — 99213 OFFICE O/P EST LOW 20 MIN: CPT | Performed by: INTERNAL MEDICINE

## 2018-04-03 PROCEDURE — G8419 CALC BMI OUT NRM PARAM NOF/U: HCPCS | Performed by: INTERNAL MEDICINE

## 2018-04-03 PROCEDURE — 3017F COLORECTAL CA SCREEN DOC REV: CPT | Performed by: INTERNAL MEDICINE

## 2018-04-03 PROCEDURE — 1036F TOBACCO NON-USER: CPT | Performed by: INTERNAL MEDICINE

## 2018-04-03 RX ORDER — VANCOMYCIN HYDROCHLORIDE 250 MG/1
250 CAPSULE ORAL 3 TIMES DAILY
Qty: 90 CAPSULE | Refills: 3 | Status: SHIPPED | OUTPATIENT
Start: 2018-04-03 | End: 2018-05-03

## 2018-04-03 ASSESSMENT — ENCOUNTER SYMPTOMS
ABDOMINAL PAIN: 0
VOMITING: 0
DIARRHEA: 1
ANAL BLEEDING: 0
NAUSEA: 0
BLOOD IN STOOL: 0
RESPIRATORY NEGATIVE: 1
RECTAL PAIN: 0
CONSTIPATION: 0

## 2018-04-10 ENCOUNTER — OFFICE VISIT (OUTPATIENT)
Dept: CARDIOLOGY CLINIC | Age: 58
End: 2018-04-10
Payer: COMMERCIAL

## 2018-04-10 VITALS
DIASTOLIC BLOOD PRESSURE: 78 MMHG | OXYGEN SATURATION: 84 % | RESPIRATION RATE: 18 BRPM | TEMPERATURE: 96.9 F | SYSTOLIC BLOOD PRESSURE: 118 MMHG | HEART RATE: 73 BPM

## 2018-04-10 DIAGNOSIS — I42.7 CARDIOMYOPATHY SECONDARY TO DRUG (HCC): Primary | ICD-10-CM

## 2018-04-10 DIAGNOSIS — Z95.0 PACEMAKER, ARTIFICIAL: ICD-10-CM

## 2018-04-10 DIAGNOSIS — I34.0 NON-RHEUMATIC MITRAL REGURGITATION: ICD-10-CM

## 2018-04-10 DIAGNOSIS — I25.5 ISCHEMIC CARDIOMYOPATHY: ICD-10-CM

## 2018-04-10 DIAGNOSIS — C49.9 SARCOMA (HCC): ICD-10-CM

## 2018-04-10 PROCEDURE — G8599 NO ASA/ANTIPLAT THER USE RNG: HCPCS | Performed by: INTERNAL MEDICINE

## 2018-04-10 PROCEDURE — 99214 OFFICE O/P EST MOD 30 MIN: CPT | Performed by: INTERNAL MEDICINE

## 2018-04-10 PROCEDURE — G8427 DOCREV CUR MEDS BY ELIG CLIN: HCPCS | Performed by: INTERNAL MEDICINE

## 2018-04-10 PROCEDURE — 1036F TOBACCO NON-USER: CPT | Performed by: INTERNAL MEDICINE

## 2018-04-10 PROCEDURE — 3017F COLORECTAL CA SCREEN DOC REV: CPT | Performed by: INTERNAL MEDICINE

## 2018-04-10 PROCEDURE — G8419 CALC BMI OUT NRM PARAM NOF/U: HCPCS | Performed by: INTERNAL MEDICINE

## 2018-04-10 ASSESSMENT — ENCOUNTER SYMPTOMS
NAUSEA: 0
CHEST TIGHTNESS: 0
SHORTNESS OF BREATH: 1
VOMITING: 0
APNEA: 0

## 2018-04-12 ENCOUNTER — OUTSIDE SERVICES (OUTPATIENT)
Dept: INFECTIOUS DISEASES | Age: 58
End: 2018-04-12
Payer: COMMERCIAL

## 2018-04-12 DIAGNOSIS — A04.72 CLOSTRIDIUM DIFFICILE COLITIS: ICD-10-CM

## 2018-04-12 DIAGNOSIS — R65.21 SEPTIC SHOCK (HCC): Primary | ICD-10-CM

## 2018-04-12 DIAGNOSIS — A41.9 SEPTIC SHOCK (HCC): Primary | ICD-10-CM

## 2018-04-12 DIAGNOSIS — K85.00 IDIOPATHIC ACUTE PANCREATITIS, UNSPECIFIED COMPLICATION STATUS: ICD-10-CM

## 2018-04-12 DIAGNOSIS — K56.609 SMALL BOWEL OBSTRUCTION (HCC): ICD-10-CM

## 2018-04-12 DIAGNOSIS — C49.9 SOFT TISSUE SARCOMA (HCC): ICD-10-CM

## 2018-04-12 PROCEDURE — 99254 IP/OBS CNSLTJ NEW/EST MOD 60: CPT | Performed by: INTERNAL MEDICINE

## (undated) DEVICE — PENCIL ES L3M BTTN SWCH HOLSTER W/ BLDE ELECTRD EDGE

## (undated) DEVICE — NEEDLE HYPO 25GA L1.5IN BLU POLYPR HUB S STL REG BVL STR

## (undated) DEVICE — SUTURE MCRYL SZ 4-0 L27IN ABSRB UD L19MM PS-2 1/2 CIR PRIM Y426H

## (undated) DEVICE — PACK,LAPAROTOMY,NO GOWNS: Brand: MEDLINE

## (undated) DEVICE — 1842 FOAM BLOCK NEEDLE COUNTER: Brand: DEVON

## (undated) DEVICE — ELECTRODE PT RET AD L9FT HI MOIST COND ADH HYDRGEL CORDED

## (undated) DEVICE — INTENDED FOR TISSUE SEPARATION, AND OTHER PROCEDURES THAT REQUIRE A SHARP SURGICAL BLADE TO PUNCTURE OR CUT.: Brand: BARD-PARKER ® CARBON RIB-BACK BLADES

## (undated) DEVICE — COVER,MAYO STAND,STERILE: Brand: MEDLINE

## (undated) DEVICE — DRAPE SURG C-ARM MOBILE XRAY LF

## (undated) DEVICE — SYRINGE MED 10ML TRNSLUC BRL PLUNG BLK MRK POLYPR CTRL

## (undated) DEVICE — SUTURE PERMA-HAND SZ 2-0 L30IN NONABSORBABLE BLK L26MM SH K833H

## (undated) DEVICE — CONVERTED USE 291618 SPONGE LAPAROTOMY POCKET POUCH RING 18

## (undated) DEVICE — CHLORAPREP 26ML ORANGE

## (undated) DEVICE — SUTURE VCRL + SZ 3-0 L27IN ABSRB UD L26MM SH 1/2 CIR VCP416H

## (undated) DEVICE — BLADE ES ELASTOMERIC COAT INSUL DURABLE BEND UPTO 90DEG

## (undated) DEVICE — SUTURE PERMAHAND SZ 2-0 L12X18IN NONABSORBABLE BLK SILK A185H

## (undated) DEVICE — GOWN,AURORA,NONREINFORCED,LARGE: Brand: MEDLINE

## (undated) DEVICE — LABEL MED MINI W/ MARKER

## (undated) DEVICE — STERILE LATEX POWDER-FREE SURGICAL GLOVESWITH NITRILE COATING: Brand: PROTEXIS

## (undated) DEVICE — SKIN MARKER,REGULAR TIP WITH RULER: Brand: DEVON